# Patient Record
Sex: MALE | Race: WHITE | NOT HISPANIC OR LATINO
[De-identification: names, ages, dates, MRNs, and addresses within clinical notes are randomized per-mention and may not be internally consistent; named-entity substitution may affect disease eponyms.]

---

## 2020-06-17 PROBLEM — Z00.00 ENCOUNTER FOR PREVENTIVE HEALTH EXAMINATION: Status: ACTIVE | Noted: 2020-06-17

## 2020-06-22 ENCOUNTER — APPOINTMENT (OUTPATIENT)
Dept: SURGERY | Facility: CLINIC | Age: 20
End: 2020-06-22
Payer: COMMERCIAL

## 2020-06-22 VITALS
WEIGHT: 245 LBS | BODY MASS INDEX: 31.44 KG/M2 | DIASTOLIC BLOOD PRESSURE: 78 MMHG | OXYGEN SATURATION: 98 % | HEIGHT: 74 IN | SYSTOLIC BLOOD PRESSURE: 143 MMHG | HEART RATE: 89 BPM | TEMPERATURE: 98.1 F

## 2020-06-22 DIAGNOSIS — Z78.9 OTHER SPECIFIED HEALTH STATUS: ICD-10-CM

## 2020-06-22 PROCEDURE — 99243 OFF/OP CNSLTJ NEW/EST LOW 30: CPT

## 2020-06-22 RX ORDER — SILVER SULFADIAZINE 10 MG/G
CREAM TOPICAL
Refills: 0 | Status: ACTIVE | COMMUNITY

## 2020-06-22 NOTE — CONSULT LETTER
[Dear  ___] : Dear  [unfilled], [Consult Letter:] : I had the pleasure of evaluating your patient, [unfilled]. [Please see my note below.] : Please see my note below. [Consult Closing:] : Thank you very much for allowing me to participate in the care of this patient.  If you have any questions, please do not hesitate to contact me. [Sincerely,] : Sincerely, [FreeTextEntry3] : Russ Thao MD, FACS

## 2020-06-22 NOTE — HISTORY OF PRESENT ILLNESS
[de-identified] : This is a 19 year  old patient who was referred by Dr. DR Page with the chief complaint of having  lower back mass.  He reports having this condition for  1 year. He denies any trauma to the area.   He denies any fever or  night sweats. Appetite is good and weight is stable.  He states that he had surgery scheduled for excision of pilonidal cyst in March but it was canceled due to COVID pandemic. the cyst s getting bigger and  more symptomatic. He wants to know if it could  be surgically  removed. he states that he saw a dermatologist who gave him Cipro and Silvadene cream he completed abx 2 weeks ago. \par \par

## 2020-06-22 NOTE — PLAN
[FreeTextEntry1] : Mr. VERGARA  was told significance of findings, options, risks and benefits were explained.  Informed consent for wide excision of pilonidal cyst and potential risks, benefits and alternatives (surgical options were discussed including non-surgical options or the option of no surgery) to the planned surgery were discussed in depth.  All surgical options were discussed including non-surgical treatments.  He wishes to proceed with surgery.  We will plan for surgery on at the next available date, pending any required insurance pre-certification or pre-approval. He agrees to obtain any necessary pre-operative evaluations and testing prior to surgery.\par Patient advised to seek immediate medical attention with any acute change in symptoms or with the development of any new or worsening symptoms.  Patient's questions and concerns addressed to patient's satisfaction, and patient verbalized an understanding of the information discussed.\par \par

## 2020-06-22 NOTE — PHYSICAL EXAM
[Oriented to Person] : oriented to person [Alert] : alert [Oriented to Place] : oriented to place [Oriented to Time] : oriented to time [Calm] : calm [de-identified] : He  is alert, well-groomed, and cheerful.\par   [de-identified] :   anicteric.  Nasal mucosa pink, septum midline. Oral mucosa pink.  Tongue midline, Pharynx without exudates.\par   [de-identified] :  Neck supple. Trachea midline. Thyroid isthmus barely palpable, lobes not felt.\par   [de-identified] : pilonidal  cyst with multiple sinuses  is Tender,    poorly defined. deep. No palpable lymph nodes.

## 2020-07-08 ENCOUNTER — OUTPATIENT (OUTPATIENT)
Dept: OUTPATIENT SERVICES | Facility: HOSPITAL | Age: 20
LOS: 1 days | End: 2020-07-08

## 2020-07-08 VITALS
OXYGEN SATURATION: 100 % | RESPIRATION RATE: 18 BRPM | SYSTOLIC BLOOD PRESSURE: 160 MMHG | HEART RATE: 95 BPM | DIASTOLIC BLOOD PRESSURE: 72 MMHG | TEMPERATURE: 98 F | WEIGHT: 250 LBS | HEIGHT: 74 IN

## 2020-07-08 DIAGNOSIS — L05.91 PILONIDAL CYST WITHOUT ABSCESS: ICD-10-CM

## 2020-07-08 DIAGNOSIS — Z01.818 ENCOUNTER FOR OTHER PREPROCEDURAL EXAMINATION: ICD-10-CM

## 2020-07-08 DIAGNOSIS — Z78.9 OTHER SPECIFIED HEALTH STATUS: Chronic | ICD-10-CM

## 2020-07-08 DIAGNOSIS — L05.92 PILONIDAL SINUS WITHOUT ABSCESS: ICD-10-CM

## 2020-07-08 NOTE — H&P PST ADULT - RS GEN PE MLT RESP DETAILS PC
respirations non-labored/normal/breath sounds equal/airway patent/clear to auscultation bilaterally/good air movement/no chest wall tenderness

## 2020-07-08 NOTE — H&P PST ADULT - NSICDXPROBLEM_GEN_ALL_CORE_FT
PROBLEM DIAGNOSES  Problem: Pilonidal cyst without abscess  Assessment and Plan: Schedule for wide excision of pilonidal cyst

## 2020-07-08 NOTE — H&P PST ADULT - NEUROLOGICAL DETAILS
responds to pain/sensation intact/deep reflexes intact/cranial nerves intact/responds to verbal commands/alert and oriented x 3

## 2020-07-08 NOTE — H&P PST ADULT - HISTORY OF PRESENT ILLNESS
19 year old male in generally good health had his first pilonidal cyst in Oct 2019. Pt states he was treated and did not have any problem with the cyst until March 2020 when his Dermatologist treated the cyst with I and Drainage and again in April 2020. Pt stated still drainage present and now for surgery Wide excision of pilonidal yst on 7/10/2020.

## 2020-07-08 NOTE — H&P PST ADULT - MUSCULOSKELETAL
details… detailed exam no joint warmth/no joint swelling/no joint erythema/ROM intact/normal strength/normal/no calf tenderness

## 2020-07-08 NOTE — H&P PST ADULT - ASSESSMENT
19 year old male in generally good health had a pilonidal cyst October 2019. Pt had treatment on this cyst than and again in March and April 2020 by his Dermatologist for an I and D. Cyst still present had a surgical consult and schedule for Wide excision of pilonidal cyst on 7/10/2020.

## 2020-07-09 ENCOUNTER — TRANSCRIPTION ENCOUNTER (OUTPATIENT)
Age: 20
End: 2020-07-09

## 2020-07-10 ENCOUNTER — OUTPATIENT (OUTPATIENT)
Dept: OUTPATIENT SERVICES | Facility: HOSPITAL | Age: 20
LOS: 1 days | End: 2020-07-10
Payer: COMMERCIAL

## 2020-07-10 ENCOUNTER — APPOINTMENT (OUTPATIENT)
Dept: SURGERY | Facility: HOSPITAL | Age: 20
End: 2020-07-10

## 2020-07-10 ENCOUNTER — RESULT REVIEW (OUTPATIENT)
Age: 20
End: 2020-07-10

## 2020-07-10 VITALS
SYSTOLIC BLOOD PRESSURE: 138 MMHG | DIASTOLIC BLOOD PRESSURE: 72 MMHG | RESPIRATION RATE: 20 BRPM | OXYGEN SATURATION: 100 % | HEART RATE: 78 BPM

## 2020-07-10 VITALS
OXYGEN SATURATION: 100 % | WEIGHT: 250 LBS | SYSTOLIC BLOOD PRESSURE: 160 MMHG | TEMPERATURE: 98 F | HEART RATE: 95 BPM | HEIGHT: 74 IN | RESPIRATION RATE: 18 BRPM | DIASTOLIC BLOOD PRESSURE: 72 MMHG

## 2020-07-10 DIAGNOSIS — L05.92 PILONIDAL SINUS WITHOUT ABSCESS: ICD-10-CM

## 2020-07-10 DIAGNOSIS — Z01.818 ENCOUNTER FOR OTHER PREPROCEDURAL EXAMINATION: ICD-10-CM

## 2020-07-10 DIAGNOSIS — L05.91 PILONIDAL CYST WITHOUT ABSCESS: ICD-10-CM

## 2020-07-10 DIAGNOSIS — Z78.9 OTHER SPECIFIED HEALTH STATUS: Chronic | ICD-10-CM

## 2020-07-10 LAB
ANION GAP SERPL CALC-SCNC: 4 MMOL/L — LOW (ref 5–17)
BASOPHILS # BLD AUTO: 0.04 K/UL — SIGNIFICANT CHANGE UP (ref 0–0.2)
BASOPHILS NFR BLD AUTO: 0.4 % — SIGNIFICANT CHANGE UP (ref 0–2)
BUN SERPL-MCNC: 12 MG/DL — SIGNIFICANT CHANGE UP (ref 7–18)
CALCIUM SERPL-MCNC: 9.2 MG/DL — SIGNIFICANT CHANGE UP (ref 8.4–10.5)
CHLORIDE SERPL-SCNC: 106 MMOL/L — SIGNIFICANT CHANGE UP (ref 96–108)
CO2 SERPL-SCNC: 30 MMOL/L — SIGNIFICANT CHANGE UP (ref 22–31)
CREAT SERPL-MCNC: 0.98 MG/DL — SIGNIFICANT CHANGE UP (ref 0.5–1.3)
EOSINOPHIL # BLD AUTO: 0.07 K/UL — SIGNIFICANT CHANGE UP (ref 0–0.5)
EOSINOPHIL NFR BLD AUTO: 0.8 % — SIGNIFICANT CHANGE UP (ref 0–6)
GLUCOSE SERPL-MCNC: 91 MG/DL — SIGNIFICANT CHANGE UP (ref 70–99)
HCT VFR BLD CALC: 47.6 % — SIGNIFICANT CHANGE UP (ref 39–50)
HGB BLD-MCNC: 15.9 G/DL — SIGNIFICANT CHANGE UP (ref 13–17)
IMM GRANULOCYTES NFR BLD AUTO: 0.4 % — SIGNIFICANT CHANGE UP (ref 0–1.5)
LYMPHOCYTES # BLD AUTO: 2.92 K/UL — SIGNIFICANT CHANGE UP (ref 1–3.3)
LYMPHOCYTES # BLD AUTO: 31.4 % — SIGNIFICANT CHANGE UP (ref 13–44)
MCHC RBC-ENTMCNC: 29.4 PG — SIGNIFICANT CHANGE UP (ref 27–34)
MCHC RBC-ENTMCNC: 33.4 GM/DL — SIGNIFICANT CHANGE UP (ref 32–36)
MCV RBC AUTO: 88 FL — SIGNIFICANT CHANGE UP (ref 80–100)
MONOCYTES # BLD AUTO: 0.6 K/UL — SIGNIFICANT CHANGE UP (ref 0–0.9)
MONOCYTES NFR BLD AUTO: 6.5 % — SIGNIFICANT CHANGE UP (ref 2–14)
NEUTROPHILS # BLD AUTO: 5.62 K/UL — SIGNIFICANT CHANGE UP (ref 1.8–7.4)
NEUTROPHILS NFR BLD AUTO: 60.5 % — SIGNIFICANT CHANGE UP (ref 43–77)
NRBC # BLD: 0 /100 WBCS — SIGNIFICANT CHANGE UP (ref 0–0)
PLATELET # BLD AUTO: 361 K/UL — SIGNIFICANT CHANGE UP (ref 150–400)
POTASSIUM SERPL-MCNC: 4.6 MMOL/L — SIGNIFICANT CHANGE UP (ref 3.5–5.3)
POTASSIUM SERPL-SCNC: 4.6 MMOL/L — SIGNIFICANT CHANGE UP (ref 3.5–5.3)
RBC # BLD: 5.41 M/UL — SIGNIFICANT CHANGE UP (ref 4.2–5.8)
RBC # FLD: 12.7 % — SIGNIFICANT CHANGE UP (ref 10.3–14.5)
SODIUM SERPL-SCNC: 140 MMOL/L — SIGNIFICANT CHANGE UP (ref 135–145)
WBC # BLD: 9.29 K/UL — SIGNIFICANT CHANGE UP (ref 3.8–10.5)
WBC # FLD AUTO: 9.29 K/UL — SIGNIFICANT CHANGE UP (ref 3.8–10.5)

## 2020-07-10 PROCEDURE — 11771 EXC PILONIDAL CYST XTNSV: CPT

## 2020-07-10 PROCEDURE — ZZZZZ: CPT

## 2020-07-10 PROCEDURE — 88304 TISSUE EXAM BY PATHOLOGIST: CPT | Mod: 26

## 2020-07-10 PROCEDURE — 88304 TISSUE EXAM BY PATHOLOGIST: CPT

## 2020-07-10 PROCEDURE — 80048 BASIC METABOLIC PNL TOTAL CA: CPT

## 2020-07-10 PROCEDURE — 36415 COLL VENOUS BLD VENIPUNCTURE: CPT

## 2020-07-10 PROCEDURE — 85027 COMPLETE CBC AUTOMATED: CPT

## 2020-07-10 RX ORDER — SODIUM CHLORIDE 9 MG/ML
3 INJECTION INTRAMUSCULAR; INTRAVENOUS; SUBCUTANEOUS EVERY 8 HOURS
Refills: 0 | Status: DISCONTINUED | OUTPATIENT
Start: 2020-07-10 | End: 2020-07-10

## 2020-07-10 RX ORDER — ACETAMINOPHEN WITH CODEINE 300MG-30MG
1 TABLET ORAL
Qty: 8 | Refills: 0
Start: 2020-07-10

## 2020-07-10 NOTE — ASU DISCHARGE PLAN (ADULT/PEDIATRIC) - D. IF YOU HAD ANY TYPE OF SEDATION, YOU MAY EXPERIENCE LIGHTHEADEDNESS, DIZZINESS, OR SLEEPINESS FOLLOWING YOUR PROCEDURE. A RESPONSIBLE ADULT SHOULD STAY WITH YOU FOR AT LEAST 24 HOURS FOLLOWING YOUR PROCEDURE.
A Healthy Lifestyle: Care Instructions  Your Care Instructions    A healthy lifestyle can help you feel good, stay at a healthy weight, and have plenty of energy for both work and play. A healthy lifestyle is something you can share with your whole family. A healthy lifestyle also can lower your risk for serious health problems, such as high blood pressure, heart disease, and diabetes. You can follow a few steps listed below to improve your health and the health of your family. Follow-up care is a key part of your treatment and safety. Be sure to make and go to all appointments, and call your doctor if you are having problems. It's also a good idea to know your test results and keep a list of the medicines you take. How can you care for yourself at home? · Do not eat too much sugar, fat, or fast foods. You can still have dessert and treats now and then. The goal is moderation. · Start small to improve your eating habits. Pay attention to portion sizes, drink less juice and soda pop, and eat more fruits and vegetables. ¨ Eat a healthy amount of food. A 3-ounce serving of meat, for example, is about the size of a deck of cards. Fill the rest of your plate with vegetables and whole grains. ¨ Limit the amount of soda and sports drinks you have every day. Drink more water when you are thirsty. ¨ Eat at least 5 servings of fruits and vegetables every day. It may seem like a lot, but it is not hard to reach this goal. A serving or helping is 1 piece of fruit, 1 cup of vegetables, or 2 cups of leafy, raw vegetables. Have an apple or some carrot sticks as an afternoon snack instead of a candy bar. Try to have fruits and/or vegetables at every meal.  · Make exercise part of your daily routine. You may want to start with simple activities, such as walking, bicycling, or slow swimming. Try to be active 30 to 60 minutes every day. You do not need to do all 30 to 60 minutes all at once.  For example, you can exercise 3 times a day for 10 or 20 minutes. Moderate exercise is safe for most people, but it is always a good idea to talk to your doctor before starting an exercise program.  · Keep moving. Kya Navarreteor the lawn, work in the garden, or [place]. Take the stairs instead of the elevator at work. · If you smoke, quit. People who smoke have an increased risk for heart attack, stroke, cancer, and other lung illnesses. Quitting is hard, but there are ways to boost your chance of quitting tobacco for good. ¨ Use nicotine gum, patches, or lozenges. ¨ Ask your doctor about stop-smoking programs and medicines. ¨ Keep trying. In addition to reducing your risk of diseases in the future, you will notice some benefits soon after you stop using tobacco. If you have shortness of breath or asthma symptoms, they will likely get better within a few weeks after you quit. · Limit how much alcohol you drink. Moderate amounts of alcohol (up to 2 drinks a day for men, 1 drink a day for women) are okay. But drinking too much can lead to liver problems, high blood pressure, and other health problems. Family health  If you have a family, there are many things you can do together to improve your health. · Eat meals together as a family as often as possible. · Eat healthy foods. This includes fruits, vegetables, lean meats and dairy, and whole grains. · Include your family in your fitness plan. Most people think of activities such as jogging or tennis as the way to fitness, but there are many ways you and your family can be more active. Anything that makes you breathe hard and gets your heart pumping is exercise. Here are some tips:  ¨ Walk to do errands or to take your child to school or the bus. ¨ Go for a family bike ride after dinner instead of watching TV. Where can you learn more? Go to http://ivan-joshua.info/. Enter N063 in the search box to learn more about \"A Healthy Lifestyle: Care Instructions. \"  Current as of: May 12, 2017  Content Version: 11.4  © 5424-2582 [employer]. Care instructions adapted under license by "[employer] (which disclaims liability or warranty for this information). If you have questions about a medical condition or this instruction, always ask your healthcare professional. [address] any warranty or liability for your use of this information. Strep Throat: Care Instructions  Your Care Instructions    Strep throat is a bacterial infection that causes sudden, severe sore throat and fever. Strep throat, which is caused by bacteria called streptococcus, is treated with antibiotics. Sometimes a strep test is necessary to tell if the sore throat is caused by strep bacteria. Treatment can help ease symptoms and may prevent future problems. Follow-up care is a key part of your treatment and safety. Be sure to make and go to all appointments, and call your doctor if you are having problems. It's also a good idea to know your test results and keep a list of the medicines you take. How can you care for yourself at home? · Take your antibiotics as directed. Do not stop taking them just because you feel better. You need to take the full course of antibiotics. · Strep throat can spread to others until 24 hours after you begin taking antibiotics. During this time, you should avoid contact with other people at work or home, especially infants and children. Do not sneeze or cough on others, and wash your hands often. Keep your drinking glass and eating utensils separate from those of others, and wash these items well in hot, soapy water. · Gargle with warm salt water at least once each hour to help reduce swelling and make your throat feel better. Use 1 teaspoon of salt mixed in 8 fluid ounces of warm water. · Take an over-the-counter pain medication, such as acetaminophen (Tylenol), ibuprofen (Advil, Motrin), or naproxen (Aleve).  Read and follow all instructions on the label. · Try an over-the-counter anesthetic throat spray or throat lozenges, which may help relieve throat pain. · Drink plenty of fluids. Fluids may help soothe an irritated throat. Hot fluids, such as tea or soup, may help your throat feel better. · Eat soft solids and drink plenty of clear liquids. Flavored ice pops, ice cream, scrambled eggs, sherbet, and gelatin dessert (such as Jell-O) may also soothe the throat. · Get lots of rest.  · Do not smoke, and avoid secondhand smoke. If you need help quitting, talk to your doctor about stop-smoking programs and medicines. These can increase your chances of quitting for good. · Use a vaporizer or humidifier to add moisture to the air in your bedroom. Follow the directions for cleaning the machine. When should you call for help? Call your doctor now or seek immediate medical care if:  ? · You have a new or higher fever. ? · You have a fever with a stiff neck or severe headache. ? · You have new or worse trouble swallowing. ? · Your sore throat gets much worse on one side. ? · Your pain becomes much worse on one side of your throat. ? Watch closely for changes in your health, and be sure to contact your doctor if:  ? · You are not getting better after 2 days (48 hours). ? · You do not get better as expected. Where can you learn more? Go to http://ivan-joshua.info/. Enter K625 in the search box to learn more about \"Strep Throat: Care Instructions. \"  Current as of: May 12, 2017  Content Version: 11.4  © 0817-8242 [employer]. Care instructions adapted under license by [employer] (which disclaims liability or warranty for this information). If you have questions about a medical condition or this instruction, always ask your healthcare professional. [address] any warranty or liability for your use of this information. Statement Selected

## 2020-07-10 NOTE — ASU DISCHARGE PLAN (ADULT/PEDIATRIC) - ASU DC SPECIAL INSTRUCTIONSFT
Drink plenty of fluids. Rest as needed. Do not lift anything heavier than 10 pounds. You may take motrin or advil for mild pain as needed, in addition to prescribed narcotic medication. Call for any fever over 101, nausea, vomiting, severe pain, no passing of gas or bowel movement. You may shower 48 hours postoperatively, remove dressing prior to showering and recover after. Cover site with clean dry gauze. Pat dry.

## 2020-07-10 NOTE — ASU DISCHARGE PLAN (ADULT/PEDIATRIC) - CALL YOUR DOCTOR IF YOU HAVE ANY OF THE FOLLOWING:
Swelling that gets worse/Wound/Surgical Site with redness, or foul smelling discharge or pus/Numbness, tingling, color or temperature change to extremity/Pain not relieved by Medications/Inability to tolerate liquids or foods/Excessive diarrhea/Bleeding that does not stop/Nausea and vomiting that does not stop/Fever greater than (need to indicate Fahrenheit or Celsius)/Unable to urinate/Increased irritability or sluggishness

## 2020-07-13 ENCOUNTER — APPOINTMENT (OUTPATIENT)
Dept: SURGERY | Facility: CLINIC | Age: 20
End: 2020-07-13
Payer: COMMERCIAL

## 2020-07-13 PROCEDURE — 99024 POSTOP FOLLOW-UP VISIT: CPT

## 2020-07-13 NOTE — ASSESSMENT
[FreeTextEntry1] : Mr. VERGARA is doing well, with excellent post-operative recovery. The surgical incision is healing well and as expected. There is no evidence of infection or complication, and patient is progressing as expected. Post-operative wound care, activity, restrictions and precautions reinforced.    Patient's questions and concerns addressed to patient's satisfaction.\par

## 2020-07-13 NOTE — PHYSICAL EXAM
[de-identified] : Surgical wound is healing and granulating well.   no signs of  inflammation or infection.  [de-identified] : He  is alert, well-groomed, and cheerful.\par

## 2020-07-13 NOTE — PLAN
[FreeTextEntry1] : patient will follow up in one week or if needed. Warning signs, follow up, and restrictions were discussed with the patient.

## 2020-07-13 NOTE — HISTORY OF PRESENT ILLNESS
[de-identified] : Mr. VERGARA  is s/p wide excision of the pilonidal cyst 07/10/2020.  Today  Mr. VERGARA offers no complaints. patient reports no fever, chills,  or  pain.  His surgical wound is healing and granulating well. No signs of inflammation, infection or exudate. Patient reports good bowel movements and appetite. \par

## 2020-07-14 LAB — SURGICAL PATHOLOGY STUDY: SIGNIFICANT CHANGE UP

## 2020-07-14 RX ORDER — ACETAMINOPHEN AND CODEINE 300; 30 MG/1; MG/1
300-30 TABLET ORAL
Qty: 8 | Refills: 0 | Status: ACTIVE | COMMUNITY
Start: 2020-07-10

## 2020-07-20 ENCOUNTER — APPOINTMENT (OUTPATIENT)
Dept: SURGERY | Facility: CLINIC | Age: 20
End: 2020-07-20
Payer: COMMERCIAL

## 2020-07-20 PROCEDURE — 99024 POSTOP FOLLOW-UP VISIT: CPT

## 2020-07-20 NOTE — ASSESSMENT
[FreeTextEntry1] : Mr. VERGARA is doing well, with excellent post-operative recovery. The surgical incision is healing well and as expected. There is no evidence of infection or complication, and patient is progressing as expected. Post-operative wound care, activity, restrictions and precautions reinforced.  Pathology results were discussed in details. Patient's questions and concerns addressed to patient's satisfaction.\par

## 2020-07-20 NOTE — DATA REVIEWED
[FreeTextEntry1] : MCKENZIE VERGARA                       \par Surgical Final Report \par Final Diagnosis\par \par Pilonidal cyst and sinus; wide excision:\par - Skin with dermal fibrosis and organizing abscess containing\par hair shaft fragments with corresponding\par multinucleated giant cell reaction, consistent with a clinical\par history of a pilonidal cyst.\par \par Verified by: Shari Hollins MD\par (Electronic Signature)\par Reported on: 07/14/20 12:10 EDT, [facility],\par [address] [place] [address]\par Phone: [phone]   Fax: [phone]\par _________________________________________________________________\par \par Clinical History\par Pilonidal cyst and sinus\par Wide excision pilonidal cyst\par

## 2020-07-20 NOTE — HISTORY OF PRESENT ILLNESS
[de-identified] : Mr. VERGARA  is s/p wide excision of the pilonidal cyst 07/10/2020.  Today  Mr. VERGARA offers no complaints. patient reports no fever, chills,  or  pain.  His surgical wound is healing and granulating well. No signs of inflammation, infection or exudate. Patient reports good bowel movements and appetite. \par

## 2020-07-20 NOTE — PHYSICAL EXAM
[de-identified] : He  is alert, well-groomed, and cheerful.\par   [de-identified] : Surgical wound is healing and granulating well.   no signs of  inflammation or infection.

## 2020-07-20 NOTE — PLAN
[FreeTextEntry1] : patient will follow up in one  month or if needed. Warning signs, follow up, and restrictions were discussed with the patient.

## 2020-07-28 LAB — SARS-COV-2 N GENE NPH QL NAA+PROBE: NOT DETECTED

## 2020-08-03 ENCOUNTER — APPOINTMENT (OUTPATIENT)
Dept: SURGERY | Facility: CLINIC | Age: 20
End: 2020-08-03
Payer: COMMERCIAL

## 2020-08-03 PROBLEM — L05.91 PILONIDAL CYST WITHOUT ABSCESS: Chronic | Status: ACTIVE | Noted: 2020-07-08

## 2020-08-03 PROCEDURE — 99024 POSTOP FOLLOW-UP VISIT: CPT

## 2020-08-03 NOTE — HISTORY OF PRESENT ILLNESS
[de-identified] : Mr. VERGARA  is s/p wide excision of the pilonidal cyst 07/10/2020.  Today  Mr. VERGARA offers no complaints. patient reports no fever, chills,  or  pain.  His surgical wound is healing and granulating well. No signs of inflammation, infection or exudate. Patient reports good bowel movements and appetite. \par

## 2020-08-03 NOTE — PHYSICAL EXAM
[de-identified] : Surgical wound is healing and granulating well.   no signs of  inflammation or infection.  [de-identified] : He  is alert, well-groomed, and cheerful.\par

## 2020-08-03 NOTE — DATA REVIEWED
[FreeTextEntry1] : MCKENZIE VERGARA                       \par Surgical Final Report \par Final Diagnosis\par \par Pilonidal cyst and sinus; wide excision:\par - Skin with dermal fibrosis and organizing abscess containing\par hair shaft fragments with corresponding\par multinucleated giant cell reaction, consistent with a clinical\par history of a pilonidal cyst.\par \par Verified by: Shari Hollins MD\par (Electronic Signature)\par Reported on: 07/14/20 12:10 EDT, [facility],\par [address] [place] [address]\par Phone: [phone]   Fax: [phone]\par _________________________________________________________________\par \par Clinical History\par Pilonidal cyst and sinus\par Wide excision pilonidal cyst\par

## 2020-08-13 ENCOUNTER — APPOINTMENT (OUTPATIENT)
Dept: SURGERY | Facility: CLINIC | Age: 20
End: 2020-08-13
Payer: COMMERCIAL

## 2020-08-13 PROCEDURE — 99024 POSTOP FOLLOW-UP VISIT: CPT

## 2020-08-13 NOTE — PHYSICAL EXAM
[de-identified] : He  is alert, well-groomed, and cheerful.\par   [de-identified] : Surgical wound is healing and granulating well.   no signs of  inflammation or infection.

## 2020-08-13 NOTE — HISTORY OF PRESENT ILLNESS
[de-identified] : Mr. VERGARA  is s/p wide excision of the pilonidal cyst 07/10/2020.  Today  Mr. VERGARA offers no complaints. patient reports no fever, chills,  or  pain.  His surgical wound is healing well. almost closed.  No signs of inflammation, infection or exudate. Patient reports good bowel movements and appetite.

## 2020-10-19 ENCOUNTER — APPOINTMENT (OUTPATIENT)
Dept: SURGERY | Facility: CLINIC | Age: 20
End: 2020-10-19
Payer: COMMERCIAL

## 2020-10-19 VITALS
WEIGHT: 250 LBS | TEMPERATURE: 98 F | SYSTOLIC BLOOD PRESSURE: 142 MMHG | BODY MASS INDEX: 32.08 KG/M2 | HEART RATE: 87 BPM | DIASTOLIC BLOOD PRESSURE: 89 MMHG | OXYGEN SATURATION: 100 % | HEIGHT: 74 IN

## 2020-10-19 PROCEDURE — 99213 OFFICE O/P EST LOW 20 MIN: CPT

## 2020-10-19 PROCEDURE — 99072 ADDL SUPL MATRL&STAF TM PHE: CPT

## 2020-10-19 NOTE — HISTORY OF PRESENT ILLNESS
[de-identified] : Mr. VERGARA is s/p wide excision of the pilonidal cyst 07/10/2020. he returns to the office with cc of drainage from the surgical area for 2-3 days. Otherwise Mr. VERGARA offers no complaints. patient reports no fever, chills,  or  pain.  His surgical wound is healing well. No signs of inflammation, infection or exudate.  there is some hypergranulation tissue \par

## 2020-10-19 NOTE — PHYSICAL EXAM
[Alert] : alert [Oriented to Person] : oriented to person [Oriented to Place] : oriented to place [Oriented to Time] : oriented to time [Calm] : calm [de-identified] : He  is alert, well-groomed, and cheerful.\par   [de-identified] : anicteric.  Nasal mucosa pink, septum midline. Oral mucosa pink.  Tongue midline, Pharynx without exudates.\par   [de-identified] : His surgical wound is healing well. No signs of inflammation, infection or exudate.  there is some hypergranulation tissue

## 2020-10-19 NOTE — PLAN
[FreeTextEntry1] : Mr. VERGARA is doing well, with excellent post-operative recovery. All surgical incisions are healing well and as expected. There is no evidence of infection or complication, and he is progressing as expected. the area was treated with silver nitrate. he will return next week for a follow up.  Patient's questions and concerns addressed to patient's satisfaction.\par

## 2020-10-26 ENCOUNTER — APPOINTMENT (OUTPATIENT)
Dept: SURGERY | Facility: CLINIC | Age: 20
End: 2020-10-26
Payer: COMMERCIAL

## 2020-10-26 VITALS
WEIGHT: 250 LBS | OXYGEN SATURATION: 100 % | TEMPERATURE: 98.7 F | SYSTOLIC BLOOD PRESSURE: 133 MMHG | BODY MASS INDEX: 32.08 KG/M2 | HEIGHT: 74 IN | DIASTOLIC BLOOD PRESSURE: 81 MMHG | HEART RATE: 91 BPM

## 2020-10-26 PROCEDURE — 99072 ADDL SUPL MATRL&STAF TM PHE: CPT

## 2020-10-26 PROCEDURE — 99212 OFFICE O/P EST SF 10 MIN: CPT

## 2020-10-26 NOTE — HISTORY OF PRESENT ILLNESS
[de-identified] : Mr. VERGARA is s/p wide excision of the pilonidal cyst 07/10/2020. he returns to the office with for a wound check.  Mr. VERGARA offers no complaints. patient reports no fever, chills,  or  pain.  His surgical wound is healing well. No signs of inflammation, infection or exudate.  \par

## 2020-10-26 NOTE — PHYSICAL EXAM
[Alert] : alert [Oriented to Person] : oriented to person [Oriented to Place] : oriented to place [Oriented to Time] : oriented to time [Calm] : calm [de-identified] : He  is alert, well-groomed, and cheerful.\par   [de-identified] : anicteric.  Nasal mucosa pink, septum midline. Oral mucosa pink.  Tongue midline, Pharynx without exudates.\par   [de-identified] : His surgical wound is healing well. No signs of inflammation, infection or exudate.

## 2020-10-26 NOTE — PLAN
[FreeTextEntry1] : Mr. VERGARA is doing well, with excellent post-operative recovery. All surgical incisions are healing well and as expected. There is no evidence of infection or complication, and he is progressing as expected. wound care, activity, restrictions and precautions reinforced.  patient will follow up in one week or if needed. Warning signs, follow up, and restrictions were discussed with the patient.  Patient's questions and concerns addressed to patient's satisfaction.\par

## 2020-11-02 ENCOUNTER — APPOINTMENT (OUTPATIENT)
Dept: SURGERY | Facility: CLINIC | Age: 20
End: 2020-11-02
Payer: COMMERCIAL

## 2020-11-02 VITALS
DIASTOLIC BLOOD PRESSURE: 83 MMHG | BODY MASS INDEX: 32.08 KG/M2 | SYSTOLIC BLOOD PRESSURE: 134 MMHG | WEIGHT: 250 LBS | HEIGHT: 74 IN | HEART RATE: 87 BPM | OXYGEN SATURATION: 100 %

## 2020-11-02 PROCEDURE — 99072 ADDL SUPL MATRL&STAF TM PHE: CPT

## 2020-11-02 PROCEDURE — 99212 OFFICE O/P EST SF 10 MIN: CPT

## 2020-11-02 NOTE — PHYSICAL EXAM
[Alert] : alert [Oriented to Person] : oriented to person [Oriented to Place] : oriented to place [Oriented to Time] : oriented to time [Calm] : calm [de-identified] : He  is alert, well-groomed, and cheerful.\par   [de-identified] : anicteric.  Nasal mucosa pink, septum midline. Oral mucosa pink.  Tongue midline, Pharynx without exudates.\par   [de-identified] : His surgical wound is healing well. No signs of inflammation, infection or exudate.

## 2020-11-02 NOTE — HISTORY OF PRESENT ILLNESS
[de-identified] : Mr. VERGARA is s/p wide excision of the pilonidal cyst 07/10/2020. he returns to the office with for a wound check.  Today  Mr. VERGARA offers no complaints. patient reports no fever, chills,  or  pain.  His surgical wound is improving .   \par

## 2020-11-09 ENCOUNTER — APPOINTMENT (OUTPATIENT)
Dept: SURGERY | Facility: CLINIC | Age: 20
End: 2020-11-09
Payer: COMMERCIAL

## 2020-11-09 VITALS
DIASTOLIC BLOOD PRESSURE: 80 MMHG | HEIGHT: 74 IN | SYSTOLIC BLOOD PRESSURE: 142 MMHG | OXYGEN SATURATION: 100 % | WEIGHT: 250 LBS | BODY MASS INDEX: 32.08 KG/M2 | HEART RATE: 82 BPM

## 2020-11-09 PROCEDURE — 99213 OFFICE O/P EST LOW 20 MIN: CPT

## 2020-11-09 PROCEDURE — 99072 ADDL SUPL MATRL&STAF TM PHE: CPT

## 2020-11-09 NOTE — ASSESSMENT
[FreeTextEntry1] : Mr. VERGARA is doing well, with excellent post-operative recovery. The surgical incision is improving There is no evidence of infection or complication, and patient is progressing as expected. proper wound care discussed with the patient and his mother. Patient's questions and concerns addressed to patient's satisfaction.

## 2020-11-09 NOTE — PHYSICAL EXAM
[Alert] : alert [Oriented to Person] : oriented to person [Oriented to Place] : oriented to place [Oriented to Time] : oriented to time [Calm] : calm [de-identified] : He  is alert, well-groomed, and cheerful.\par   [de-identified] : anicteric.  Nasal mucosa pink, septum midline. Oral mucosa pink.  Tongue midline, Pharynx without exudates.\par   [de-identified] : His surgical wound is healing well. No signs of inflammation, infection or exudate.

## 2020-11-09 NOTE — HISTORY OF PRESENT ILLNESS
[de-identified] : Mr. VERGARA is s/p wide excision of the pilonidal cyst 07/10/2020. he returns to the office with for a wound check.  Today  Mr. VERGARA offers no complaints. patient reports no fever, chills,  or  pain.  His surgical wound is improving.   \par

## 2020-11-16 ENCOUNTER — APPOINTMENT (OUTPATIENT)
Dept: SURGERY | Facility: CLINIC | Age: 20
End: 2020-11-16
Payer: COMMERCIAL

## 2020-11-16 VITALS
WEIGHT: 250 LBS | SYSTOLIC BLOOD PRESSURE: 138 MMHG | HEART RATE: 99 BPM | DIASTOLIC BLOOD PRESSURE: 82 MMHG | TEMPERATURE: 98 F | HEIGHT: 74 IN | BODY MASS INDEX: 32.08 KG/M2

## 2020-11-16 PROCEDURE — 99072 ADDL SUPL MATRL&STAF TM PHE: CPT

## 2020-11-16 PROCEDURE — 99213 OFFICE O/P EST LOW 20 MIN: CPT

## 2020-11-16 NOTE — HISTORY OF PRESENT ILLNESS
[de-identified] : Mr. VERGARA is s/p wide excision of the pilonidal cyst 07/10/2020. he returns to the office with for a wound check.  Today  Mr. VERGARA offers no complaints. patient reports no fever, chills,  or  pain.  His surgical wound is improving.   \par

## 2020-11-16 NOTE — PHYSICAL EXAM
[Alert] : alert [Oriented to Person] : oriented to person [Oriented to Place] : oriented to place [Oriented to Time] : oriented to time [Calm] : calm [de-identified] : He  is alert, well-groomed, and cheerful.\par   [de-identified] : anicteric.  Nasal mucosa pink, septum midline. Oral mucosa pink.  Tongue midline, Pharynx without exudates.\par   [de-identified] : His surgical wound is healing well. No signs of inflammation, infection or exudate.

## 2020-11-16 NOTE — ASSESSMENT
[FreeTextEntry1] : Mr. VERGARA is doing well, with excellent post-operative recovery. The surgical incision is improving There is no evidence of infection or complication, and patient is progressing as expected. proper wound care discussed with the patient and his mother. Hair around the wound were removed using the shaver that the patient brought with him.  Patient's questions and concerns addressed to patient's satisfaction. \par

## 2020-11-23 ENCOUNTER — APPOINTMENT (OUTPATIENT)
Dept: SURGERY | Facility: CLINIC | Age: 20
End: 2020-11-23
Payer: COMMERCIAL

## 2020-11-23 VITALS
TEMPERATURE: 98.4 F | BODY MASS INDEX: 32.08 KG/M2 | DIASTOLIC BLOOD PRESSURE: 79 MMHG | HEART RATE: 89 BPM | WEIGHT: 250 LBS | HEIGHT: 74 IN | SYSTOLIC BLOOD PRESSURE: 125 MMHG

## 2020-11-23 PROCEDURE — 99213 OFFICE O/P EST LOW 20 MIN: CPT

## 2020-11-23 NOTE — PLAN
[FreeTextEntry1] : Mr. VERGARA is doing well, with excellent post-operative recovery. The surgical incision is improving There is no evidence of infection or complication, and patient is progressing as expected. proper wound care discussed with the patient and his mother.  Area with hypergranulation was treated with silver nitrate.  Patient's questions and concerns addressed to patient's satisfaction. \par  \par

## 2020-11-23 NOTE — PHYSICAL EXAM
[Alert] : alert [Oriented to Person] : oriented to person [Oriented to Place] : oriented to place [Oriented to Time] : oriented to time [Calm] : calm [de-identified] : He  is alert, well-groomed, and cheerful.\par   [de-identified] : anicteric.  Nasal mucosa pink, septum midline. Oral mucosa pink.  Tongue midline, Pharynx without exudates.\par   [de-identified] : His surgical wound is healing well. No signs of inflammation, infection or exudate.

## 2020-11-23 NOTE — HISTORY OF PRESENT ILLNESS
[de-identified] : Mr. VERGARA is s/p wide excision of the pilonidal cyst 07/10/2020. he returns to the office with for a wound check.  Today  Mr. VERGARA offers no complaints. patient reports no fever, chills,  or  pain.  His surgical wound is improving.   patient has a small area of hypergranulation tissue . \par

## 2020-11-30 ENCOUNTER — APPOINTMENT (OUTPATIENT)
Dept: SURGERY | Facility: CLINIC | Age: 20
End: 2020-11-30
Payer: COMMERCIAL

## 2020-11-30 VITALS
TEMPERATURE: 98.1 F | HEART RATE: 85 BPM | BODY MASS INDEX: 32.08 KG/M2 | SYSTOLIC BLOOD PRESSURE: 123 MMHG | WEIGHT: 250 LBS | HEIGHT: 74 IN | DIASTOLIC BLOOD PRESSURE: 83 MMHG

## 2020-11-30 PROCEDURE — 99213 OFFICE O/P EST LOW 20 MIN: CPT

## 2020-11-30 NOTE — PLAN
[FreeTextEntry1] : patient will follow up in 1 week  or if needed. Warning signs, follow up, and restrictions were discussed with the patient.

## 2020-11-30 NOTE — PHYSICAL EXAM
[Alert] : alert [Oriented to Person] : oriented to person [Oriented to Place] : oriented to place [Oriented to Time] : oriented to time [Calm] : calm [de-identified] : He  is alert, well-groomed, and cheerful.\par   [de-identified] : anicteric.  Nasal mucosa pink, septum midline. Oral mucosa pink.  Tongue midline, Pharynx without exudates.\par   [de-identified] : His surgical wound is healing well. No signs of inflammation, infection or exudate.

## 2020-11-30 NOTE — ASSESSMENT
[FreeTextEntry1] : Mr. VERGARA is doing well, with excellent post-operative recovery. All surgical incisions are healing well and as expected. There is no evidence of infection or complication, and he is progressing as expected.   Hair was removed using a shaver.  Patient's questions and concerns addressed to patient's satisfaction.\par

## 2020-11-30 NOTE — HISTORY OF PRESENT ILLNESS
[de-identified] : Mr. VERGARA is s/p wide excision of the pilonidal cyst 07/10/2020. he returns to the office with for a wound check.  Today  Mr. VERGARA offers no complaints. patient reports no fever, chills,  or  pain.  His surgical wound is improving and almost completely closed .   patient has a small area of hypergranulation tissue with hair below the granulating tissue . \par

## 2020-12-07 ENCOUNTER — APPOINTMENT (OUTPATIENT)
Dept: SURGERY | Facility: CLINIC | Age: 20
End: 2020-12-07
Payer: COMMERCIAL

## 2020-12-07 VITALS
WEIGHT: 250 LBS | HEIGHT: 74 IN | OXYGEN SATURATION: 100 % | HEART RATE: 75 BPM | TEMPERATURE: 98 F | DIASTOLIC BLOOD PRESSURE: 82 MMHG | BODY MASS INDEX: 32.08 KG/M2 | SYSTOLIC BLOOD PRESSURE: 130 MMHG

## 2020-12-07 VITALS — TEMPERATURE: 96.8 F

## 2020-12-07 PROCEDURE — 99072 ADDL SUPL MATRL&STAF TM PHE: CPT

## 2020-12-07 PROCEDURE — 99213 OFFICE O/P EST LOW 20 MIN: CPT

## 2020-12-07 NOTE — PHYSICAL EXAM
[Alert] : alert [Oriented to Person] : oriented to person [Oriented to Place] : oriented to place [Oriented to Time] : oriented to time [Calm] : calm [de-identified] : He  is alert, well-groomed, and cheerful.\par   [de-identified] : anicteric.  Nasal mucosa pink, septum midline. Oral mucosa pink.  Tongue midline, Pharynx without exudates.\par   [de-identified] : His surgical wound is healing well. No signs of inflammation, infection or exudate.

## 2020-12-07 NOTE — PLAN
[FreeTextEntry1] : \par  Mr. VERGARA will follow up  if needed. Warning signs, follow up, and restrictions were discussed with the patient.

## 2020-12-07 NOTE — HISTORY OF PRESENT ILLNESS
[de-identified] : Mr. VERGARA is s/p wide excision of the pilonidal cyst 07/10/2020. he returns to the office with for a wound check.  Today  Mr. VERGARA offers no complaints. patient reports no fever, chills,  or  pain.  His surgical wound i  almost completely closed .    \par

## 2020-12-07 NOTE — ASSESSMENT
[FreeTextEntry1] : Mr. VERGARA is doing well, with excellent post-operative recovery. The surgical incision is healing well and as expected. There is no evidence of infection or complication, and patient is progressing as expected.   Patient's questions and concerns addressed to patient's satisfaction.

## 2020-12-21 ENCOUNTER — APPOINTMENT (OUTPATIENT)
Dept: SURGERY | Facility: CLINIC | Age: 20
End: 2020-12-21
Payer: COMMERCIAL

## 2020-12-21 VITALS
SYSTOLIC BLOOD PRESSURE: 140 MMHG | HEIGHT: 74 IN | WEIGHT: 250 LBS | HEART RATE: 77 BPM | BODY MASS INDEX: 32.08 KG/M2 | DIASTOLIC BLOOD PRESSURE: 71 MMHG

## 2020-12-21 PROCEDURE — 99072 ADDL SUPL MATRL&STAF TM PHE: CPT

## 2020-12-21 PROCEDURE — 99213 OFFICE O/P EST LOW 20 MIN: CPT

## 2020-12-21 NOTE — PLAN
[FreeTextEntry1] : Mr. VERGARA is doing well, with good post-operative recovery. The surgical incision is healing well and as expected.  there is a   small area of hypergranulation tissue in the proximal   portion of the wound with small  palpable fluid accumulation.         There is no evidence of infection or complication, and patient is progressing as expected.  area was treated with silver nitrate. Patient's questions and concerns addressed to patient's satisfaction.

## 2020-12-21 NOTE — HISTORY OF PRESENT ILLNESS
[de-identified] : Mr. VERGARA is s/p wide excision of the pilonidal cyst 07/10/2020. he returns to the office with  a cc of having a  bump in the surgical area. he states that he had some drainage coming out  and went to see the dermathologist who prescribed a cream and antibiotic.  Today  Mr. VERGARA offers no complaints. patient reports no fever, chills,  or  pain.  His surgical wound is  closed .  there is a small area of hypergranulation tissue in the proximal   portion of the wound with small  palpable fluid accumulation.  \par

## 2020-12-21 NOTE — PHYSICAL EXAM
[Alert] : alert [Oriented to Person] : oriented to person [Oriented to Place] : oriented to place [Oriented to Time] : oriented to time [Calm] : calm [de-identified] : He  is alert, well-groomed, and cheerful.\par   [de-identified] : anicteric.  Nasal mucosa pink, septum midline. Oral mucosa pink.  Tongue midline, Pharynx without exudates.\par   [de-identified] : small area of hypergranulation tissue in the proximal   portion of the wound with small  palpable fluid accumulation.  His surgical wound is healing well. No signs of inflammation, infection or exudate.

## 2021-01-04 ENCOUNTER — APPOINTMENT (OUTPATIENT)
Dept: SURGERY | Facility: CLINIC | Age: 21
End: 2021-01-04
Payer: COMMERCIAL

## 2021-01-04 VITALS
HEART RATE: 75 BPM | HEIGHT: 74 IN | DIASTOLIC BLOOD PRESSURE: 72 MMHG | WEIGHT: 250 LBS | BODY MASS INDEX: 32.08 KG/M2 | SYSTOLIC BLOOD PRESSURE: 135 MMHG

## 2021-01-04 PROCEDURE — 99213 OFFICE O/P EST LOW 20 MIN: CPT

## 2021-01-04 PROCEDURE — 99072 ADDL SUPL MATRL&STAF TM PHE: CPT

## 2021-01-04 NOTE — PHYSICAL EXAM
[de-identified] : He  is alert, well-groomed, and cheerful.\par   [de-identified] : Surgical wound is healing and granulating well.   no signs of  inflammation or infection.

## 2021-01-04 NOTE — PLAN
[FreeTextEntry1] : Mr. VERGARA is doing well, with good post-operative recovery. The surgical incision is healing well and as expected. there is a small area of hypergranulation tissue in the proximal portion of the wound with small palpable fluid accumulation. There is no evidence of infection or complication, and patient is progressing as expected. area was treated with silver nitrate. Patient's questions and concerns addressed to patient's satisfaction. return in 2 weeks

## 2021-01-04 NOTE — HISTORY OF PRESENT ILLNESS
[de-identified] : Mr. VERGARA  is s/p wide excision of the pilonidal cyst 07/10/2020. Today Mr. VERGARA offers no complaints. patient reports no fever, chills, or pain. His surgical wound  has a  small area of hypergranulation tissue in the proximal portion of the wound.

## 2021-01-25 ENCOUNTER — APPOINTMENT (OUTPATIENT)
Dept: SURGERY | Facility: CLINIC | Age: 21
End: 2021-01-25
Payer: COMMERCIAL

## 2021-01-25 VITALS — TEMPERATURE: 97.5 F

## 2021-01-25 VITALS
SYSTOLIC BLOOD PRESSURE: 130 MMHG | HEART RATE: 71 BPM | BODY MASS INDEX: 32.08 KG/M2 | DIASTOLIC BLOOD PRESSURE: 75 MMHG | HEIGHT: 74 IN | WEIGHT: 250 LBS

## 2021-01-25 PROCEDURE — 99072 ADDL SUPL MATRL&STAF TM PHE: CPT

## 2021-01-25 PROCEDURE — 99213 OFFICE O/P EST LOW 20 MIN: CPT

## 2021-01-25 NOTE — HISTORY OF PRESENT ILLNESS
[de-identified] : Mr. VERGARA  is s/p wide excision of the pilonidal cyst 07/10/2020.  patient returning for a wound check. Today  Mr. VERGARA offers no complaints. patient reports no fever, chills,  or  pain.  His surgical wound is healing well. Patient reports good bowel movements and appetite. \par

## 2021-01-25 NOTE — PHYSICAL EXAM
[Alert] : alert [Oriented to Person] : oriented to person [Oriented to Place] : oriented to place [Oriented to Time] : oriented to time [Calm] : calm [de-identified] : He  is alert, well-groomed, and cheerful.\par   [de-identified] : anicteric.  Nasal mucosa pink, septum midline. Oral mucosa pink.  Tongue midline, Pharynx without exudates.\par   [de-identified] : small area of hypergranulation tissue in the proximal   portion of the wound.   His surgical wound is healing well. No signs of inflammation, infection or exudate.

## 2021-02-08 ENCOUNTER — APPOINTMENT (OUTPATIENT)
Dept: SURGERY | Facility: CLINIC | Age: 21
End: 2021-02-08
Payer: COMMERCIAL

## 2021-02-08 PROCEDURE — 99213 OFFICE O/P EST LOW 20 MIN: CPT

## 2021-02-08 PROCEDURE — 99072 ADDL SUPL MATRL&STAF TM PHE: CPT

## 2021-02-08 NOTE — HISTORY OF PRESENT ILLNESS
[de-identified] : Mr. VERGARA  is s/p wide excision of the pilonidal cyst 07/10/2020.  patient returning for a wound check. Today  Mr. VERGARA offers no complaints. patient reports no fever, chills,  or  pain.  His surgical wound is healing well he reports drainage from the area.  Patient reports good bowel movements and appetite. \par

## 2021-02-08 NOTE — PHYSICAL EXAM
[Alert] : alert [Oriented to Person] : oriented to person [Oriented to Place] : oriented to place [Oriented to Time] : oriented to time [Calm] : calm [de-identified] : He  is alert, well-groomed, and cheerful.\par   [de-identified] : anicteric.  Nasal mucosa pink, septum midline. Oral mucosa pink.  Tongue midline, Pharynx without exudates.\par   [de-identified] : 2 small  areas   with hypergranulation tissue. one in the in the  distal and one in medial portion of the wound.    No signs of inflammation, infection or exudate.

## 2021-02-08 NOTE — PLAN
[FreeTextEntry1] : Mr. VERGARA is doing well, with good post-operative recovery. The surgical incision is healing well  there are 2 small area of hypergranulation tissue in the distal and medial portion of the wound.  There is no evidence of infection or complication.  areas was shaved and  treated with silver nitrate.  he will return in 2 weeks. Patient's questions and concerns addressed to patient's satisfaction.

## 2021-02-22 ENCOUNTER — APPOINTMENT (OUTPATIENT)
Dept: SURGERY | Facility: CLINIC | Age: 21
End: 2021-02-22
Payer: COMMERCIAL

## 2021-02-22 VITALS
OXYGEN SATURATION: 100 % | HEART RATE: 80 BPM | HEIGHT: 74 IN | WEIGHT: 250 LBS | BODY MASS INDEX: 32.08 KG/M2 | DIASTOLIC BLOOD PRESSURE: 80 MMHG | SYSTOLIC BLOOD PRESSURE: 137 MMHG

## 2021-02-22 PROCEDURE — 99213 OFFICE O/P EST LOW 20 MIN: CPT

## 2021-02-22 PROCEDURE — 99072 ADDL SUPL MATRL&STAF TM PHE: CPT

## 2021-02-22 NOTE — PLAN
[FreeTextEntry1] : Mr. VERGARA is doing well, with good post-operative recovery. The surgical incision is healing well  there are 2 small area of hypergranulation tissue in the distal and medial portion of the wound.  There is no evidence of infection or complication.  areas was shaved and  treated with silver nitrate.  he will return in 3 weeks. Patient's questions and concerns addressed to patient's satisfaction.

## 2021-02-22 NOTE — PHYSICAL EXAM
[Alert] : alert [Oriented to Person] : oriented to person [Oriented to Place] : oriented to place [Oriented to Time] : oriented to time [Calm] : calm [de-identified] : He  is alert, well-groomed, and cheerful.\par   [de-identified] : anicteric.  Nasal mucosa pink, septum midline. Oral mucosa pink.  Tongue midline, Pharynx without exudates.\par   [de-identified] : 2 small  areas   with hypergranulation tissue. one in the in the  distal and one in medial portion of the wound.    No signs of inflammation, infection or exudate.

## 2021-02-22 NOTE — HISTORY OF PRESENT ILLNESS
[de-identified] : Mr. VERGARA  is s/p wide excision of the pilonidal cyst 07/10/2020.  patient returning for a wound check. Today  Mr. VERGARA offers no complaints. patient reports no fever, chills,  or  pain.  His surgical wound is healing well he reports drainage from the area.  Patient reports good bowel movements and appetite. \par

## 2021-03-15 ENCOUNTER — APPOINTMENT (OUTPATIENT)
Dept: SURGERY | Facility: CLINIC | Age: 21
End: 2021-03-15
Payer: COMMERCIAL

## 2021-03-15 VITALS
WEIGHT: 250 LBS | DIASTOLIC BLOOD PRESSURE: 83 MMHG | HEART RATE: 71 BPM | HEIGHT: 74 IN | BODY MASS INDEX: 32.08 KG/M2 | SYSTOLIC BLOOD PRESSURE: 126 MMHG | TEMPERATURE: 98.4 F

## 2021-03-15 PROCEDURE — 99072 ADDL SUPL MATRL&STAF TM PHE: CPT

## 2021-03-15 PROCEDURE — 99213 OFFICE O/P EST LOW 20 MIN: CPT

## 2021-03-15 NOTE — HISTORY OF PRESENT ILLNESS
[de-identified] : Mr. VERGARA  is s/p wide excision of the pilonidal cyst 07/10/2020.  patient returning for a wound check. Today  Mr. VERGARA offers no complaints. patient reports no fever, chills,  or  pain.  His surgical wound is healing well he reports drainage from the area.  Patient reports good bowel movements and appetite. \par

## 2021-03-15 NOTE — PLAN
[FreeTextEntry1] : Mr. VERGARA is doing well, with good post-operative recovery. The surgical incision is healing well  there are 2 small area of hypergranulation tissue in the distal and medial portion of the wound.  There is no evidence of infection or complication.   hair was removed from the area silver nitrate was applied.  he will return in 2 weeks. Patient's questions and concerns addressed to patient's satisfaction.

## 2021-03-15 NOTE — PHYSICAL EXAM
[Alert] : alert [Oriented to Person] : oriented to person [Oriented to Place] : oriented to place [Oriented to Time] : oriented to time [Calm] : calm [de-identified] : He  is alert, well-groomed, and cheerful.\par   [de-identified] : anicteric.  Nasal mucosa pink, septum midline. Oral mucosa pink.  Tongue midline, Pharynx without exudates.\par   [de-identified] : 2 small  areas   with hypergranulation tissue. one in the in the  distal and one in medial portion of the wound.    No signs of inflammation, infection or exudate.

## 2021-03-17 NOTE — DATA REVIEWED
COVID-19 Screening:    • Does the patient OR patient’s household members have any of the following symptoms?  o Temperature: Fever ?100.0°F or ?37.8°C?  No  o Respiratory symptoms: New or worsening cough, shortness of breath, difficulty breathing, or sore throat? No  o GI symptoms: New onset of nausea, vomiting or diarrhea?  No  o Miscellaneous: New onset of loss of taste or smell, chills, repeated shaking with chills, muscle pain, headache, congestion or runny nose?  No  • Has the patient or a household member tested positive for COVID-19 in the last 14 days?  No  • Has the patient or a household member been tested for COVID-19 and are waiting for the results?  No     [No studies available for review at this time.] : No studies available for review at this time.

## 2021-03-29 ENCOUNTER — APPOINTMENT (OUTPATIENT)
Dept: SURGERY | Facility: CLINIC | Age: 21
End: 2021-03-29
Payer: COMMERCIAL

## 2021-03-29 VITALS
BODY MASS INDEX: 32.08 KG/M2 | WEIGHT: 250 LBS | HEIGHT: 74 IN | DIASTOLIC BLOOD PRESSURE: 79 MMHG | SYSTOLIC BLOOD PRESSURE: 123 MMHG | HEART RATE: 72 BPM

## 2021-03-29 PROCEDURE — 99213 OFFICE O/P EST LOW 20 MIN: CPT

## 2021-03-29 PROCEDURE — 99072 ADDL SUPL MATRL&STAF TM PHE: CPT

## 2021-04-06 NOTE — PHYSICAL EXAM
[Alert] : alert [Oriented to Person] : oriented to person [Oriented to Place] : oriented to place [Oriented to Time] : oriented to time [Calm] : calm [de-identified] : He  is alert, well-groomed, and cheerful.\par   [de-identified] : anicteric.  Nasal mucosa pink, septum midline. Oral mucosa pink.  Tongue midline, Pharynx without exudates.\par   [de-identified] : 2 small  areas   with hypergranulation tissue. one in the in the  distal and one in medial portion of the wound.    No signs of inflammation, infection or exudate.

## 2021-04-06 NOTE — HISTORY OF PRESENT ILLNESS
[de-identified] : Mr. VERGARA  is s/p wide excision of the pilonidal cyst 07/10/2020.  patient returning for a wound check. Today  Mr. VERGARA offers no complaints. patient reports no fever, chills,  or  pain.  His surgical wound is healing well he reports drainage from the area.  Patient reports good bowel movements and appetite. \par

## 2021-04-12 ENCOUNTER — APPOINTMENT (OUTPATIENT)
Dept: SURGERY | Facility: CLINIC | Age: 21
End: 2021-04-12
Payer: COMMERCIAL

## 2021-04-12 PROCEDURE — 99213 OFFICE O/P EST LOW 20 MIN: CPT

## 2021-04-12 PROCEDURE — 99072 ADDL SUPL MATRL&STAF TM PHE: CPT

## 2021-04-12 RX ORDER — FOAM BANDAGE 3" X 3"
4"X5" BANDAGE TOPICAL
Qty: 1 | Refills: 2 | Status: ACTIVE | COMMUNITY
Start: 2021-04-12 | End: 1900-01-01

## 2021-04-12 NOTE — PHYSICAL EXAM
[Alert] : alert [Oriented to Person] : oriented to person [Oriented to Place] : oriented to place [Oriented to Time] : oriented to time [Calm] : calm [de-identified] : He  is alert, well-groomed, and cheerful.\par   [de-identified] : anicteric.  Nasal mucosa pink, septum midline. Oral mucosa pink.  Tongue midline, Pharynx without exudates.\par   [de-identified] : 2 small  areas   with hypergranulation tissue. one in the in the  distal and one in medial portion of the wound.    No signs of inflammation, infection or exudate.

## 2021-04-12 NOTE — HISTORY OF PRESENT ILLNESS
[de-identified] : Mr. VERGARA  is s/p wide excision of the pilonidal cyst 07/10/2020.  patient returning for a wound check. Today  Mr. VERGARA offers no complaints. patient reports no fever, chills,  or  pain.  His surgical wound is healing well he reports occasional small amount of drainage from the area.  his mother is helping him with wound care.  Patient reports good bowel movements and appetite. \par

## 2021-04-12 NOTE — PLAN
[FreeTextEntry1] : Mr. VERGARA is doing well, . The surgical incision is healing sloower than expected due to the excesir hair growth and patient not packing the wound all the way inside. there are 2 small area of hypergranulation tissue in the distal and medial portion of the wound.  There is no evidence of infection or complication.  areas was shaved and  treated with silver nitrate. wound care demonstrated to patient's mother again. will try using aquacell product to try to facilitate the healing .  he will return in 2 weeks. Patient's questions and concerns addressed to patient's satisfaction.

## 2021-04-26 ENCOUNTER — APPOINTMENT (OUTPATIENT)
Dept: SURGERY | Facility: CLINIC | Age: 21
End: 2021-04-26
Payer: COMMERCIAL

## 2021-04-26 PROCEDURE — 99213 OFFICE O/P EST LOW 20 MIN: CPT

## 2021-04-26 PROCEDURE — 99072 ADDL SUPL MATRL&STAF TM PHE: CPT

## 2021-04-26 NOTE — HISTORY OF PRESENT ILLNESS
[de-identified] : Mr. VERGARA  is s/p wide excision of the pilonidal cyst 07/10/2020.  patient returning for a wound check. Today  Mr. VERGARA offers no complaints. patient reports no fever, chills,  or  pain.  His surgical wound is healing well .   his mother is helping him with wound care.  Patient reports good bowel movements and appetite. \par

## 2021-04-26 NOTE — ASSESSMENT
[FreeTextEntry1] : Mr. VERGARA is doing well, with excellent post-operative recovery. All surgical incisions are healing well and as expected. There is no evidence of infection or complication, and he is progressing as expected.   Patient's questions and concerns addressed to patient's satisfaction.\par

## 2021-04-26 NOTE — PLAN
[FreeTextEntry1] : Mr. VERGARA will follow up  if needed. Warning signs, follow up, and restrictions were discussed with the patient.

## 2021-04-26 NOTE — PHYSICAL EXAM
[Alert] : alert [Oriented to Person] : oriented to person [Oriented to Place] : oriented to place [Oriented to Time] : oriented to time [Calm] : calm [de-identified] : He  is alert, well-groomed, and cheerful.\par   [de-identified] : anicteric.  Nasal mucosa pink, septum midline. Oral mucosa pink.  Tongue midline, Pharynx without exudates.\par   [de-identified] :  the wound is closed,    No signs of inflammation, infection or exudate.

## 2021-06-17 ENCOUNTER — APPOINTMENT (OUTPATIENT)
Dept: SURGERY | Facility: CLINIC | Age: 21
End: 2021-06-17
Payer: COMMERCIAL

## 2021-06-17 VITALS
BODY MASS INDEX: 32.08 KG/M2 | HEART RATE: 75 BPM | DIASTOLIC BLOOD PRESSURE: 81 MMHG | WEIGHT: 250 LBS | HEIGHT: 74 IN | SYSTOLIC BLOOD PRESSURE: 130 MMHG

## 2021-06-17 VITALS — TEMPERATURE: 97 F

## 2021-06-17 PROCEDURE — 99213 OFFICE O/P EST LOW 20 MIN: CPT

## 2021-06-17 PROCEDURE — 99072 ADDL SUPL MATRL&STAF TM PHE: CPT

## 2021-06-17 NOTE — PHYSICAL EXAM
[Alert] : alert [Oriented to Person] : oriented to person [Oriented to Place] : oriented to place [Oriented to Time] : oriented to time [Calm] : calm [de-identified] : He  is alert, well-groomed, and cheerful.\par   [de-identified] : anicteric.  Nasal mucosa pink, septum midline. Oral mucosa pink.  Tongue midline, Pharynx without exudates.\par   [de-identified] : small 0.5 cm opening in the top portion of the wound. the rest of the  the wound is closed,    No signs of inflammation, infection or exudate.

## 2021-06-17 NOTE — PLAN
[FreeTextEntry1] : Mr. VERGARA is doing well, with good post-operative recovery. The surgical incision is healing  well. the small area of the wound was treated with silver nitrate   There is no evidence of infection or complication.    he will return in  1 week. Patient's questions and concerns addressed to patient's satisfaction.

## 2021-06-17 NOTE — HISTORY OF PRESENT ILLNESS
[de-identified] : Mr. VERGARA  is s/p wide excision of the pilonidal cyst 07/10/2020.  patient returns with cc of serous drainage from the upper portion of the wound.   otherwise he offers no complaints. patient reports no fever, chills,  or  pain. \par

## 2021-06-24 ENCOUNTER — APPOINTMENT (OUTPATIENT)
Dept: SURGERY | Facility: CLINIC | Age: 21
End: 2021-06-24
Payer: COMMERCIAL

## 2021-06-24 DIAGNOSIS — L05.91 PILONIDAL CYST W/OUT ABSCESS: ICD-10-CM

## 2021-06-24 PROCEDURE — 99072 ADDL SUPL MATRL&STAF TM PHE: CPT

## 2021-06-24 PROCEDURE — 99213 OFFICE O/P EST LOW 20 MIN: CPT

## 2021-06-24 NOTE — HISTORY OF PRESENT ILLNESS
[de-identified] : Mr. VERGARA  is s/p wide excision of the pilonidal cyst 07/10/2020.  Today  Mr. VERGARA offers no complaints. patient reports no fever, chills,  or  pain.   the area of the surgery has improved. there are  2 small 0.3 cm sinuses without signs of infection or drainage . \par

## 2021-06-24 NOTE — PLAN
[FreeTextEntry1] : Mr. VERGARA is doing well, with good post-operative recovery. The surgical incision is healing well  there are 2 small area of opening  that have improved   There is no evidence of infection or complication.  areas was   and  treated with silver nitrate. Mr. VERGARA will follow up  if needed. Warning signs, follow up, and restrictions were discussed with the patient.

## 2021-06-24 NOTE — PHYSICAL EXAM
[Alert] : alert [Oriented to Person] : oriented to person [Oriented to Place] : oriented to place [Oriented to Time] : oriented to time [Calm] : calm [de-identified] : He  is alert, well-groomed, and cheerful.\par   [de-identified] : anicteric.  Nasal mucosa pink, septum midline. Oral mucosa pink.  Tongue midline, Pharynx without exudates.\par   [de-identified] : 2 small 0.3 cm openings in the top portion and the medial portion  of the wound. the rest of the  the wound is closed,    No signs of inflammation, infection or exudate.
